# Patient Record
Sex: MALE | Race: WHITE
[De-identification: names, ages, dates, MRNs, and addresses within clinical notes are randomized per-mention and may not be internally consistent; named-entity substitution may affect disease eponyms.]

---

## 2017-05-03 ENCOUNTER — HOSPITAL ENCOUNTER (EMERGENCY)
Age: 37
Discharge: HOME | End: 2017-05-03
Payer: MEDICAID

## 2017-05-03 DIAGNOSIS — L03.113: ICD-10-CM

## 2017-05-03 DIAGNOSIS — R03.0: ICD-10-CM
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
## 2017-05-03 DIAGNOSIS — S61.441A: Primary | ICD-10-CM
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
## 2017-05-03 PROCEDURE — 99283 EMERGENCY DEPT VISIT LOW MDM: CPT

## 2017-12-06 ENCOUNTER — HOSPITAL ENCOUNTER (EMERGENCY)
Dept: HOSPITAL 76 - ED | Age: 37
Discharge: HOME | End: 2017-12-06
Payer: SELF-PAY

## 2017-12-06 VITALS — DIASTOLIC BLOOD PRESSURE: 82 MMHG | SYSTOLIC BLOOD PRESSURE: 142 MMHG

## 2017-12-06 DIAGNOSIS — T31.0: ICD-10-CM
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
## 2017-12-06 DIAGNOSIS — T22.20XA: Primary | ICD-10-CM
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
## 2017-12-06 PROCEDURE — 99283 EMERGENCY DEPT VISIT LOW MDM: CPT

## 2017-12-06 PROCEDURE — 99282 EMERGENCY DEPT VISIT SF MDM: CPT
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
## 2019-11-06 ENCOUNTER — HOSPITAL ENCOUNTER (EMERGENCY)
Dept: HOSPITAL 76 - ED | Age: 39
Discharge: HOME | End: 2019-11-06
Payer: SELF-PAY

## 2019-11-06 VITALS — DIASTOLIC BLOOD PRESSURE: 105 MMHG | SYSTOLIC BLOOD PRESSURE: 146 MMHG

## 2019-11-06 DIAGNOSIS — M54.41: Primary | ICD-10-CM

## 2019-11-06 PROCEDURE — 99284 EMERGENCY DEPT VISIT MOD MDM: CPT

## 2019-11-06 PROCEDURE — 73502 X-RAY EXAM HIP UNI 2-3 VIEWS: CPT

## 2019-11-06 PROCEDURE — 99283 EMERGENCY DEPT VISIT LOW MDM: CPT
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
## 2019-11-11 ENCOUNTER — HOSPITAL ENCOUNTER (EMERGENCY)
Dept: HOSPITAL 76 - ED | Age: 39
Discharge: HOME | End: 2019-11-11
Payer: MEDICAID

## 2019-11-11 VITALS — DIASTOLIC BLOOD PRESSURE: 84 MMHG | SYSTOLIC BLOOD PRESSURE: 128 MMHG

## 2019-11-11 DIAGNOSIS — M54.41: Primary | ICD-10-CM

## 2019-11-11 PROCEDURE — 99284 EMERGENCY DEPT VISIT MOD MDM: CPT

## 2019-11-11 PROCEDURE — 99282 EMERGENCY DEPT VISIT SF MDM: CPT

## 2019-11-11 NOTE — ED PHYSICIAN DOCUMENTATION
PD HPI BACK PAIN





- Stated complaint


Stated Complaint: PAIN AND SWELLING RT HIP





- Chief complaint


Chief Complaint: General





- History obtained from


History obtained from: Patient





- History of Present Illness


Timing - onset: How many weeks ago (3)


Timing - duration: Weeks (3)


Timing - details: Gradual onset, Still present, Waxing and waning


Location: Mid, Lower, Right


Quality: Pain, Spasm, Sharp, Similar to prior episodes


Associated symptoms: Numbness (To portions of the right leg).  No: Fever, 

Weakness


Improves with: Rest, Position, Meds


Contributing factors: Other (works as a  has prior disc disease)


Similar symptoms before: Diagnosis (sciatica)


Recently seen: Emergency Dept





- Additional information


Additional information: 





39-year-old male who works as a  has developed pain in his mid and lower 

back on the right side that radiates into the right groin.  He feels that his 

right hip is gone in and out of place.  He has had symptoms similar to this 

previously over the past 10 years after an initial injury to his back lifting a 

pallet of lorenzo material.  He has had MRI previously with disc disease.  He 

denies any urinary symptoms denies any incontinence denies any saddle 

anesthesia.  He is ambulating well but is not able to go back to work.





He was seen in the emergency department here and administered lidocaine patch 

and Flexeril.  He has had some relief of pain with use of these but has 

persistent and shifting pains. 





Review of Systems


Constitutional: denies: Fever


Eyes: denies: Decreased vision


Ears: denies: Ear pain


Nose: denies: Congestion


Throat: denies: Sore throat


Cardiac: denies: Chest pain / pressure, Palpitations


Respiratory: denies: Dyspnea, Cough


GI: denies: Abdominal Pain, Nausea, Vomiting


: denies: Dysuria, Frequency


Skin: denies: Rash


Musculoskeletal: reports: Back pain, Extremity pain, Joint pain.  denies: Neck 

pain


Neurologic: denies: Generalized weakness, Focal weakness, Numbness





PD PAST MEDICAL HISTORY





- Past Medical History


Past Medical History: No





- Past Surgical History


Past Surgical History: No





- Present Medications


Home Medications: 


                                Ambulatory Orders











 Medication  Instructions  Recorded  Confirmed


 


Cyclobenzaprine [Flexeril] 10 mg PO TID PRN #20 tablet 11/06/19 


 


Lidocaine Patch 5% [Lidoderm Patch] 1 patch TOP DAILY PRN #10 patch 11/06/19 


 


Cyclobenzaprine [Flexeril] 10 mg PO TID PRN #20 tablet 11/11/19 


 


Hydrocodone/Acetaminophen 1 - 2 each PO Q6H PRN #14 tablet 11/11/19 





[Hydrocodon-Acetaminophen 5-325]   














- Allergies


Allergies/Adverse Reactions: 


                                    Allergies











Allergy/AdvReac Type Severity Reaction Status Date / Time


 


No Known Drug Allergies Allergy   Verified 11/06/19 08:51














- Social History


Does the pt smoke?: No


Smoking Status: Never smoker


Does the pt drink ETOH?: No


Does the pt have substance abuse?: Yes





- Immunizations


Immunizations are current?: Yes





- POLST


Patient has POLST: No





PD ED PE NORMAL





- Vitals


Vital signs reviewed: Yes





- General


General: Alert and oriented X 3, No acute distress, Well developed/nourished, 

Other (The patient is kinetic like maybe meth. )





- HEENT


HEENT: Atraumatic, PERRL, EOMI





- Neck


Neck: Supple, no meningeal sign, No bony TTP





- Respiratory


Respiratory: No respiratory distress





- Back


Back: No CVA TTP, No spinal TTP, Other (There is pain to palpation of the 

paraspinous muscles of the back. There is referred pain to the hip with 

palpation of the back and pain into the lateral thigh with palpation of the 

abdomen which is not distended or swollen. )





- Derm


Derm: Normal color, Warm and dry, No rash





- Extremities


Extremities: No deformity, Normal ROM s pain, No edema, No calf tenderness / 

cord





- Neuro


Neuro: Alert and oriented X 3, CNs 2-12 intact, No motor deficit, No sensory 

deficit, Normal speech


Eye Opening: Spontaneous


Motor: Obeys Commands


Verbal: Oriented


GCS Score: 15





- Psych


Psych: Normal mood, Normal affect





Results





- Vitals


Vitals: 


                               Vital Signs - 24 hr











  11/11/19 11/11/19 11/11/19





  21:59 23:07 23:13


 


Temperature  37.0 C 


 


Heart Rate 114 H 107 H 101 H


 


Respiratory 16 18 16





Rate   


 


Blood Pressure 138/110 H 131/86 H 128/84 H


 


O2 Saturation 97 99 99








                                     Oxygen











O2 Source                      Room air

















PD MEDICAL DECISION MAKING





- ED course


Complexity details: reviewed old records, reviewed results, re-evaluated 

patient, considered differential, d/w patient


ED course: 





38 y/o male with a history of sciatica has an increase in his sciatica symptoms 

and he is administered decadron and toradal and we will place him on a short 

course of narcotic and muscle relaxant. 





Departure





- Departure


Disposition: 01 Home, Self Care


Clinical Impression: 


Sciatica


Qualifiers:


 Laterality: right Qualified Code(s): M54.31 - Sciatica, right side





Condition: Stable


Instructions:  ED Sciatica


Follow-Up: 


[facility] [Provider Group]


Prescriptions: 


Cyclobenzaprine [Flexeril] 10 mg PO TID PRN #20 tablet


 PRN Reason: Spasms


Hydrocodone/Acetaminophen [Hydrocodon-Acetaminophen 5-325] 1 - 2 each PO Q6H PRN

#14 tablet


 PRN Reason: pain


Discharge Date/Time: 11/11/19 23:13

## 2019-11-21 ENCOUNTER — HOSPITAL ENCOUNTER (OUTPATIENT)
Dept: HOSPITAL 76 - DI.N | Age: 39
Discharge: HOME | End: 2019-11-21
Attending: PHYSICIAN ASSISTANT
Payer: MEDICAID

## 2019-11-21 DIAGNOSIS — M41.9: ICD-10-CM

## 2019-11-21 DIAGNOSIS — M25.551: ICD-10-CM

## 2019-11-21 DIAGNOSIS — M54.5: Primary | ICD-10-CM

## 2019-11-21 PROCEDURE — 72110 X-RAY EXAM L-2 SPINE 4/>VWS: CPT

## 2019-11-21 NOTE — XRAY REPORT
Reason:  HIP PAIN

Procedure Date:  11/21/2019   

Accession Number:  [id number] / [id number]                    

Procedure:  XRN - Lumbar Spine Complete CPT Code:  

 

***Final Report***

 

 

FULL RESULT:

 

 

EXAM:

LUMBOSACRAL SPINE RADIOGRAPHY

 

EXAM DATE: 11/21/2019 11:18 AM.

 

CLINICAL HISTORY: Hip pain.

 

COMPARISONS: HIP W/PELVIS 2-3V RT 11/06/2019 9:52 AM.

 

TECHNIQUE: 5 views.

 

FINDINGS:

Alignment: Mild approximately 6.8 degree dextroconvex scoliosis centered 

about L2-L3. No spondylolisthesis.

 

Bones: Five non-rib-bearing lumbar vertebral bodies are present. No 

fractures or bone lesions.

 

Disks: Disk space heights are generally preserved with mild marginal 

osteophytosis at L3-L4.

 

Facets: No degenerative changes.

 

Sacroiliac Joints: Unremarkable.

 

Soft Tissues: Normal. The visualized bowel gas pattern is normal.

IMPRESSION: Mild scoliosis, possibly positional.

 

[facility] ambulatory

## 2023-06-22 NOTE — XRAY REPORT
Reason:  R hip pain

Procedure Date:  11/06/2019   

Accession Number:  [id number] / [id number]                    

Procedure:  XR  - Hip w/Pelvis 2-3V RT CPT Code:  

 

***Final Report***

 

 

FULL RESULT:

 

 

EXAM: RIGHT HIP RADIOGRAPHY

 

EXAM DATE: 11/6/2019 09:59 AM.

 

CLINICAL HISTORY: Right hip pain for 1 month.

 

COMPARISON: None.

 

TECHNIQUE: 2 views.

 

FINDINGS:

Bones: Normal. No fractures or bone lesion.

 

Joints: There is an approximately 1 mm calcific density projecting along 

the lateral margin of the acetabular roof on the right. No dislocation. 

The hip joint space is preserved.

 

Soft Tissues: Normal. No soft tissue swelling.

IMPRESSION: Minimal subtle calcifications along the lateral aspect of the 

right acetabular roof, not a specific finding.

 

[facility]
low fat/renal replacement pts:no protein restr,no conc K & phos, low sodium